# Patient Record
Sex: FEMALE | Race: WHITE | NOT HISPANIC OR LATINO | Employment: FULL TIME | URBAN - METROPOLITAN AREA
[De-identification: names, ages, dates, MRNs, and addresses within clinical notes are randomized per-mention and may not be internally consistent; named-entity substitution may affect disease eponyms.]

---

## 2023-01-23 ENCOUNTER — APPOINTMENT (OUTPATIENT)
Dept: CT IMAGING | Facility: HOSPITAL | Age: 58
End: 2023-01-23
Payer: COMMERCIAL

## 2023-01-23 ENCOUNTER — APPOINTMENT (OUTPATIENT)
Dept: GENERAL RADIOLOGY | Facility: HOSPITAL | Age: 58
End: 2023-01-23
Payer: COMMERCIAL

## 2023-01-23 ENCOUNTER — HOSPITAL ENCOUNTER (EMERGENCY)
Facility: HOSPITAL | Age: 58
Discharge: HOME | End: 2023-01-23
Attending: EMERGENCY MEDICINE | Admitting: EMERGENCY MEDICINE
Payer: COMMERCIAL

## 2023-01-23 VITALS
TEMPERATURE: 98.3 F | OXYGEN SATURATION: 100 % | RESPIRATION RATE: 18 BRPM | BODY MASS INDEX: 39.95 KG/M2 | SYSTOLIC BLOOD PRESSURE: 155 MMHG | HEIGHT: 64 IN | WEIGHT: 234 LBS | DIASTOLIC BLOOD PRESSURE: 87 MMHG | HEART RATE: 54 BPM

## 2023-01-23 DIAGNOSIS — R55 NEAR SYNCOPE: Primary | ICD-10-CM

## 2023-01-23 DIAGNOSIS — R53.83 FATIGUE, UNSPECIFIED TYPE: ICD-10-CM

## 2023-01-23 LAB
ALBUMIN SERPL-MCNC: 4.8 G/DL (ref 3.5–5.2)
ALBUMIN/GLOB SERPL: 1.7 G/DL
ALP SERPL-CCNC: 101 U/L (ref 39–117)
ALT SERPL W P-5'-P-CCNC: 17 U/L (ref 1–33)
ANION GAP SERPL CALCULATED.3IONS-SCNC: 13 MMOL/L (ref 5–15)
AST SERPL-CCNC: 24 U/L (ref 1–32)
BASOPHILS # BLD AUTO: 0.04 10*3/MM3 (ref 0–0.2)
BASOPHILS NFR BLD AUTO: 0.6 % (ref 0–1.5)
BILIRUB SERPL-MCNC: 0.4 MG/DL (ref 0–1.2)
BUN SERPL-MCNC: 18 MG/DL (ref 6–20)
BUN/CREAT SERPL: 23.1 (ref 7–25)
CALCIUM SPEC-SCNC: 9.9 MG/DL (ref 8.6–10.5)
CHLORIDE SERPL-SCNC: 104 MMOL/L (ref 98–107)
CO2 SERPL-SCNC: 23 MMOL/L (ref 22–29)
CREAT SERPL-MCNC: 0.78 MG/DL (ref 0.57–1)
D DIMER PPP FEU-MCNC: 0.31 MCGFEU/ML (ref 0–0.56)
DEPRECATED RDW RBC AUTO: 41.6 FL (ref 37–54)
EGFRCR SERPLBLD CKD-EPI 2021: 89.3 ML/MIN/1.73
EOSINOPHIL # BLD AUTO: 0.31 10*3/MM3 (ref 0–0.4)
EOSINOPHIL NFR BLD AUTO: 4.5 % (ref 0.3–6.2)
ERYTHROCYTE [DISTWIDTH] IN BLOOD BY AUTOMATED COUNT: 12.9 % (ref 12.3–15.4)
GLOBULIN UR ELPH-MCNC: 2.9 GM/DL
GLUCOSE SERPL-MCNC: 117 MG/DL (ref 65–99)
HCT VFR BLD AUTO: 44.3 % (ref 34–46.6)
HGB BLD-MCNC: 14.9 G/DL (ref 12–15.9)
HOLD SPECIMEN: NORMAL
IMM GRANULOCYTES # BLD AUTO: 0.01 10*3/MM3 (ref 0–0.05)
IMM GRANULOCYTES NFR BLD AUTO: 0.1 % (ref 0–0.5)
LYMPHOCYTES # BLD AUTO: 2.68 10*3/MM3 (ref 0.7–3.1)
LYMPHOCYTES NFR BLD AUTO: 38.7 % (ref 19.6–45.3)
MAGNESIUM SERPL-MCNC: 2.1 MG/DL (ref 1.6–2.6)
MCH RBC QN AUTO: 29.7 PG (ref 26.6–33)
MCHC RBC AUTO-ENTMCNC: 33.6 G/DL (ref 31.5–35.7)
MCV RBC AUTO: 88.4 FL (ref 79–97)
MONOCYTES # BLD AUTO: 0.46 10*3/MM3 (ref 0.1–0.9)
MONOCYTES NFR BLD AUTO: 6.6 % (ref 5–12)
NEUTROPHILS NFR BLD AUTO: 3.42 10*3/MM3 (ref 1.7–7)
NEUTROPHILS NFR BLD AUTO: 49.5 % (ref 42.7–76)
NRBC BLD AUTO-RTO: 0 /100 WBC (ref 0–0.2)
PLATELET # BLD AUTO: 303 10*3/MM3 (ref 140–450)
PMV BLD AUTO: 9.2 FL (ref 6–12)
POTASSIUM SERPL-SCNC: 4.1 MMOL/L (ref 3.5–5.2)
PROT SERPL-MCNC: 7.7 G/DL (ref 6–8.5)
RBC # BLD AUTO: 5.01 10*6/MM3 (ref 3.77–5.28)
SODIUM SERPL-SCNC: 140 MMOL/L (ref 136–145)
TROPONIN T SERPL-MCNC: <0.01 NG/ML (ref 0–0.03)
WBC NRBC COR # BLD: 6.92 10*3/MM3 (ref 3.4–10.8)
WHOLE BLOOD HOLD COAG: NORMAL
WHOLE BLOOD HOLD SPECIMEN: NORMAL

## 2023-01-23 PROCEDURE — 85379 FIBRIN DEGRADATION QUANT: CPT | Performed by: EMERGENCY MEDICINE

## 2023-01-23 PROCEDURE — 84484 ASSAY OF TROPONIN QUANT: CPT | Performed by: EMERGENCY MEDICINE

## 2023-01-23 PROCEDURE — 85025 COMPLETE CBC W/AUTO DIFF WBC: CPT

## 2023-01-23 PROCEDURE — 93005 ELECTROCARDIOGRAM TRACING: CPT | Performed by: EMERGENCY MEDICINE

## 2023-01-23 PROCEDURE — 36415 COLL VENOUS BLD VENIPUNCTURE: CPT

## 2023-01-23 PROCEDURE — 70450 CT HEAD/BRAIN W/O DYE: CPT

## 2023-01-23 PROCEDURE — 99284 EMERGENCY DEPT VISIT MOD MDM: CPT

## 2023-01-23 PROCEDURE — 71045 X-RAY EXAM CHEST 1 VIEW: CPT

## 2023-01-23 PROCEDURE — 99283 EMERGENCY DEPT VISIT LOW MDM: CPT

## 2023-01-23 PROCEDURE — 80053 COMPREHEN METABOLIC PANEL: CPT | Performed by: EMERGENCY MEDICINE

## 2023-01-23 PROCEDURE — 93005 ELECTROCARDIOGRAM TRACING: CPT

## 2023-01-23 PROCEDURE — 83735 ASSAY OF MAGNESIUM: CPT | Performed by: EMERGENCY MEDICINE

## 2023-01-23 RX ORDER — SODIUM CHLORIDE 0.9 % (FLUSH) 0.9 %
10 SYRINGE (ML) INJECTION AS NEEDED
Status: DISCONTINUED | OUTPATIENT
Start: 2023-01-23 | End: 2023-01-23 | Stop reason: HOSPADM

## 2023-01-23 NOTE — DISCHARGE INSTRUCTIONS
Make sure the rest and drink plenty of fluids.    Follow-up with cardiologist for further outpatient evaluation.    Return to the ER with any further concern.

## 2023-01-23 NOTE — Clinical Note
[facility] EMERGENCY DEPARTMENT  [address] [address]  Phone: [phone]    Radha Dow was seen and treated in our emergency department on 1/23/2023.  She may return to work on 01/26/2023.         Thank you for choosing [facility].    Erum Cox MD

## 2023-01-23 NOTE — ED PROVIDER NOTES
Subjective   History of Present Illness  57-year-old female who presents for evaluation of near syncopal episode.  The patient reports that she was at work at roughly 8:30 AM talking to a coworker when she began to feel dizzy and faint.  She was eased to the ground did not truly lose consciousness.  She did feel weak at that time.  The symptoms now have essentially completely resolved.  She denies having an episode like this in the past.  She does report that she is doing intermittent fasting, she stopped eating at 8 PM and does not usually eat for greater than 12 hours until the next day.  However she has continued to eat throughout the day and has continued to drink plenty of fluids even when she is not eating.  She denies recent fever, body aches, or chills.  She denies any sick contacts.  She denies any new medications.  She does report intermittently for the last several weeks she will have episodes where she feels like her vision is altered, and then it slowly returns to normal.  She denies palpitation.  No reported abdominal pain.  No reported change in bowel or urinary function.  No other acute complaints        Review of Systems   Constitutional: Negative for chills, fatigue and fever.   HENT: Negative for congestion, ear pain, postnasal drip, sinus pressure and sore throat.    Eyes: Positive for visual disturbance. Negative for pain and redness.   Respiratory: Negative for cough, chest tightness and shortness of breath.    Cardiovascular: Negative for chest pain, palpitations and leg swelling.   Gastrointestinal: Negative for abdominal pain, anal bleeding, blood in stool, diarrhea, nausea and vomiting.   Endocrine: Negative for polydipsia and polyuria.   Genitourinary: Negative for difficulty urinating, dysuria, frequency and urgency.   Musculoskeletal: Negative for arthralgias, back pain and neck pain.   Skin: Negative for pallor and rash.   Allergic/Immunologic: Negative for environmental allergies and  immunocompromised state.   Neurological: Positive for dizziness, weakness and light-headedness. Negative for headaches.   Hematological: Negative for adenopathy.   Psychiatric/Behavioral: Negative for confusion, self-injury and suicidal ideas. The patient is not nervous/anxious.    All other systems reviewed and are negative.      No past medical history on file.    No Known Allergies    No past surgical history on file.    No family history on file.    Social History     Socioeconomic History   • Marital status:            Objective   Physical Exam  Vitals and nursing note reviewed.   Constitutional:       General: She is not in acute distress.     Appearance: Normal appearance. She is well-developed. She is not toxic-appearing or diaphoretic.   HENT:      Head: Normocephalic and atraumatic.      Right Ear: External ear normal.      Left Ear: External ear normal.      Nose: Nose normal.   Eyes:      General: Lids are normal.      Pupils: Pupils are equal, round, and reactive to light.   Neck:      Trachea: No tracheal deviation.   Cardiovascular:      Rate and Rhythm: Normal rate and regular rhythm.      Pulses: No decreased pulses.      Heart sounds: Normal heart sounds. No murmur heard.    No friction rub. No gallop.   Pulmonary:      Effort: Pulmonary effort is normal. No respiratory distress.      Breath sounds: Normal breath sounds. No decreased breath sounds, wheezing, rhonchi or rales.   Abdominal:      General: Bowel sounds are normal.      Palpations: Abdomen is soft.      Tenderness: There is no abdominal tenderness. There is no guarding or rebound.   Musculoskeletal:         General: No deformity. Normal range of motion.      Cervical back: Normal range of motion and neck supple.   Lymphadenopathy:      Cervical: No cervical adenopathy.   Skin:     General: Skin is warm and dry.      Findings: No rash.   Neurological:      Mental Status: She is alert and oriented to person, place, and time.       Cranial Nerves: No cranial nerve deficit.      Sensory: No sensory deficit.   Psychiatric:         Speech: Speech normal.         Behavior: Behavior normal.         Thought Content: Thought content normal.         Judgment: Judgment normal.         Procedures           ED Course                                           Medical Decision Making  Differential diagnosis includes syncope, vasovagal syncope, dysrhythmia, intracranial hemorrhage, stroke, otherwise unspecified alteration in mental status.    CT scan of the head without contrast shows no acute abnormalities.  On exam the patient is well-appearing with normal vital signs.  No focal neurological deficits with NIH stroke scale of 0 and a GCS of 15.    She has current normal vital signs.  D-dimer is normal.    The patient will be discharged with referral to the syncope clinic.    Near syncope: acute illness or injury     Details: Normal CT scan of the head.  D-dimer normal.  EKG shows normal sinus rhythm with normal troponin and normal vital signs.  Amount and/or Complexity of Data Reviewed  Independent Historian: spouse  Labs: ordered.     Details: Normal troponin, normal D-dimer.  Radiology: ordered.     Details: CT scan of the head without contrast shows no acute abnormalities.  Normal chest x-ray.  ECG/medicine tests: ordered.     Details: EKG shows normal sinus rhythm.      Risk  Prescription drug management.          Final diagnoses:   Near syncope   Fatigue, unspecified type       ED Disposition  ED Disposition     ED Disposition   Discharge    Condition   Stable    Comment   --             [facility] CARDIOLOGY  [address]  [place] [address] [phone]             Medication List      No changes were made to your prescriptions during this visit.          Erum Cox MD  01/23/23 7058

## 2023-01-27 LAB
QT INTERVAL: 384 MS
QTC INTERVAL: 434 MS

## 2023-01-30 ENCOUNTER — OFFICE VISIT (OUTPATIENT)
Dept: CARDIOLOGY | Facility: HOSPITAL | Age: 58
End: 2023-01-30
Payer: COMMERCIAL

## 2023-01-30 VITALS
HEART RATE: 66 BPM | OXYGEN SATURATION: 97 % | HEIGHT: 64 IN | SYSTOLIC BLOOD PRESSURE: 109 MMHG | TEMPERATURE: 96.9 F | WEIGHT: 242 LBS | BODY MASS INDEX: 41.32 KG/M2 | DIASTOLIC BLOOD PRESSURE: 74 MMHG | RESPIRATION RATE: 17 BRPM

## 2023-01-30 DIAGNOSIS — R55 NEAR SYNCOPE: Primary | ICD-10-CM

## 2023-01-30 PROCEDURE — 99203 OFFICE O/P NEW LOW 30 MIN: CPT | Performed by: NURSE PRACTITIONER

## 2023-01-30 RX ORDER — CHLORAL HYDRATE 500 MG
1 CAPSULE ORAL DAILY
COMMUNITY

## 2023-01-30 RX ORDER — MULTIPLE VITAMINS W/ MINERALS TAB 9MG-400MCG
1 TAB ORAL DAILY
COMMUNITY

## 2023-01-30 RX ORDER — FAMOTIDINE 10 MG
10 TABLET ORAL DAILY
COMMUNITY

## 2023-01-30 NOTE — PROGRESS NOTES
"Chief Complaint  Establish Care and Loss of Consciousness    Subjective    History of Present Illness {CC  Problem List  Visit  Diagnosis   Encounters  Notes  Medications  Labs  Result Review Imaging  Media :23}       History of Present Illness   57-year-old female presents the office today for ongoing evaluation of her recent near syncopal spell.  Patient reports that she was talking to her coworker on the morning of 1/23/2023 when she began to feel dizzy and faint.  She was eased to the ground and did not lose consciousness.  She did note associated fatigue and weakness.  Patient recently started intermittent fasting at the beginning of this month.  Patient reports she has never had a syncopal spell before.  She reports she is feeling well currently denies chest pain, dyspnea, dizziness, presyncope or syncope.  Objective     Vital Signs:   Vitals:    01/30/23 1442 01/30/23 1443   BP: 110/79 109/74   BP Location: Left arm Left arm   Patient Position: Sitting Standing   Cuff Size: Adult Adult   Pulse: 66    Resp: 17    Temp: 96.9 °F (36.1 °C)    TempSrc: Temporal    SpO2: 97%    Weight: 110 kg (242 lb)    Height: 162.6 cm (64\")      Body mass index is 41.54 kg/m².  Physical Exam  Vitals and nursing note reviewed.   Constitutional:       Appearance: Normal appearance.   HENT:      Head: Normocephalic.   Eyes:      Pupils: Pupils are equal, round, and reactive to light.   Cardiovascular:      Rate and Rhythm: Normal rate and regular rhythm.      Pulses: Normal pulses.      Heart sounds: Normal heart sounds. No murmur heard.  Pulmonary:      Effort: Pulmonary effort is normal.      Breath sounds: Normal breath sounds.   Abdominal:      General: Bowel sounds are normal.      Palpations: Abdomen is soft.   Musculoskeletal:         General: Normal range of motion.      Cervical back: Normal range of motion.      Right lower leg: No edema.      Left lower leg: No edema.   Skin:     General: Skin is warm and dry. "      Capillary Refill: Capillary refill takes less than 2 seconds.   Neurological:      Mental Status: She is alert and oriented to person, place, and time.   Psychiatric:         Mood and Affect: Mood normal.         Thought Content: Thought content normal.              Result Review  Data Reviewed:{ Labs  Result Review  Imaging  Med Tab  Media :23}   Vent. Rate :  77 BPM     Atrial Rate :  77 BPM     P-R Int : 156 ms          QRS Dur :  76 ms      QT Int : 384 ms       P-R-T Axes :  18  48  20 degrees     QTc Int : 434 ms     Normal sinus rhythm  Normal ECG  No previous ECGs available  Confirmed by AMIE GRADY ([id number]) on 1/27/2023 4:40:29 PM     Referred By: EDMD           Confirmed By: AMIE GRADY    D-dimer, Quantitative (01/23/2023 09:46)  Magnesium (01/23/2023 09:46)  Troponin (01/23/2023 09:46)  Comprehensive Metabolic Panel (01/23/2023 09:46)  CBC & Differential (01/23/2023 09:46)           Assessment and Plan {CC Problem List  Visit Diagnosis  ROS  Review (Popup)  Health Maintenance  Quality  BestPractice  Medications  SmartSets  SnapShot Encounters  Media :23}   1. Near syncope  Suspect related to intermittent fasting  Encouraged good hydration  Did discuss testing today such as echo and Holter monitor however patient would like to defer at this time          Follow Up {Instructions Charge Capture  Follow-up Communications :23}   Return if symptoms worsen or fail to improve.    Patient was given instructions and counseling regarding her condition or for health maintenance advice. Please see specific information pulled into the AVS if appropriate.  Patient was instructed to call the [facility] with any questions, concerns, or worsening symptoms.

## 2024-10-04 ENCOUNTER — OFFICE VISIT (OUTPATIENT)
Dept: INTERNAL MEDICINE | Facility: CLINIC | Age: 59
End: 2024-10-04
Payer: COMMERCIAL

## 2024-10-04 ENCOUNTER — LAB (OUTPATIENT)
Dept: LAB | Facility: HOSPITAL | Age: 59
End: 2024-10-04
Payer: COMMERCIAL

## 2024-10-04 VITALS
TEMPERATURE: 98.2 F | DIASTOLIC BLOOD PRESSURE: 84 MMHG | HEIGHT: 64 IN | HEART RATE: 65 BPM | OXYGEN SATURATION: 99 % | WEIGHT: 226.2 LBS | BODY MASS INDEX: 38.62 KG/M2 | SYSTOLIC BLOOD PRESSURE: 130 MMHG

## 2024-10-04 DIAGNOSIS — E55.9 VITAMIN D DEFICIENCY: ICD-10-CM

## 2024-10-04 DIAGNOSIS — Z12.11 COLON CANCER SCREENING: ICD-10-CM

## 2024-10-04 DIAGNOSIS — Z00.00 HEALTHCARE MAINTENANCE: ICD-10-CM

## 2024-10-04 DIAGNOSIS — Z12.31 BREAST CANCER SCREENING BY MAMMOGRAM: ICD-10-CM

## 2024-10-04 DIAGNOSIS — M25.541 ARTHRALGIA OF BOTH HANDS: ICD-10-CM

## 2024-10-04 DIAGNOSIS — Z00.00 HEALTHCARE MAINTENANCE: Primary | ICD-10-CM

## 2024-10-04 DIAGNOSIS — M25.542 ARTHRALGIA OF BOTH HANDS: ICD-10-CM

## 2024-10-04 DIAGNOSIS — M79.89 LEG SWELLING: ICD-10-CM

## 2024-10-04 LAB — HBA1C MFR BLD: 5 % (ref 4.8–5.6)

## 2024-10-04 PROCEDURE — 86803 HEPATITIS C AB TEST: CPT

## 2024-10-04 PROCEDURE — 83036 HEMOGLOBIN GLYCOSYLATED A1C: CPT

## 2024-10-04 PROCEDURE — 84443 ASSAY THYROID STIM HORMONE: CPT

## 2024-10-04 PROCEDURE — 82607 VITAMIN B-12: CPT

## 2024-10-04 PROCEDURE — 86431 RHEUMATOID FACTOR QUANT: CPT

## 2024-10-04 PROCEDURE — 80053 COMPREHEN METABOLIC PANEL: CPT

## 2024-10-04 PROCEDURE — 82306 VITAMIN D 25 HYDROXY: CPT

## 2024-10-04 PROCEDURE — 86200 CCP ANTIBODY: CPT

## 2024-10-04 PROCEDURE — 80061 LIPID PANEL: CPT

## 2024-10-04 PROCEDURE — 84439 ASSAY OF FREE THYROXINE: CPT

## 2024-10-04 PROCEDURE — 36415 COLL VENOUS BLD VENIPUNCTURE: CPT

## 2024-10-04 NOTE — PROGRESS NOTES
New Patient Office Visit      Date: 10/04/2024   Patient Name: Radha Dow  : 1965   MRN: [id number]     Chief Complaint:    Chief Complaint   Patient presents with    Establish Care    Hand Pain     Arthritis    Chronic Fatigue       History of Present Illness: Radha Dow is a 59 y.o. female who is here today to establish care.      She has been having a few new symptoms since having COVID a few years ago.  She reports diffuse joint pains most notable in her hands, feet and bilateral knees.  She reports significant morning stiffness, and pain at night.  She has tried Voltaren gel which has not helped her pain she reports heat does sometimes help.  She has also had some hair thinning, chronic fatigue, oral ulcers, muscle weakness.  She denies any history of blood clots.  Denies history of miscarriage.  Her father does have a history of rheumatoid arthritis.  She has also had progressive bilateral ankle swelling.  She reports history of syncope in the past and had an echocardiogram which she reports showed some valve regurgitation.    She does have a history of MTHR mutation     Blood pressure - 130/84  BMI -38.83    Chronic medical conditions:  No current medical conditions, she takes no daily medications    Social history: Patient lives in [place] with .  Smoking: denies, Alcohol history: socially, Drug history: denies.      Subjective      Review of Systems:   Review of Systems   Constitutional:  Negative for chills and fever.   Respiratory:  Negative for cough and shortness of breath.    Gastrointestinal:  Positive for diarrhea. Negative for blood in stool.   Endocrine: Positive for cold intolerance.   Musculoskeletal:  Positive for arthralgias.   Neurological:  Negative for light-headedness and headache.       Past Medical History:   Past Medical History:   Diagnosis Date    Anxiety     Yes, off and on over the years    Arthritis     Cholelithiasis     Had gb removed    Colon  "polyp     Depression     GERD (gastroesophageal reflux disease)     Kidney stone     MTHFR gene mutation     Obesity     Urinary tract infection        Past Surgical History:   Past Surgical History:   Procedure Laterality Date     SECTION      CHOLECYSTECTOMY      COLONOSCOPY  2018    HYSTERECTOMY      partial    TONSILLECTOMY      TUBAL ABDOMINAL LIGATION         Family History:   Family History   Problem Relation Age of Onset    Heart attack Paternal Grandfather     Heart failure Paternal Grandfather     Hypertension Mother     Alcohol abuse Mother     Anxiety disorder Mother     Alcohol abuse Father     Anxiety disorder Father     Depression Father     Alcohol abuse Maternal Grandfather     Anxiety disorder Maternal Grandmother     Anxiety disorder Maternal Aunt        Social History:   Social History     Socioeconomic History    Marital status:    Tobacco Use    Smoking status: Never   Substance and Sexual Activity    Alcohol use: Yes     Comment: Wvery once in awhile    Drug use: Not Currently     Types: Marijuana    Sexual activity: Yes     Partners: Male     Birth control/protection: Post-menopausal, Tubal ligation, Hysterectomy       Medications:     Current Outpatient Medications:     Omega-3 1000 MG capsule, Take 1 capsule by mouth Daily., Disp: , Rfl:     Vitamin D-Vitamin K (VITAMIN K2-VITAMIN D3 PO), Take 1 tablet by mouth Daily., Disp: , Rfl:     Allergies:   No Known Allergies    Objective     Physical Exam:  Vital Signs:   Vitals:    10/04/24 1451   BP: 130/84   BP Location: Left arm   Patient Position: Sitting   Cuff Size: Adult   Pulse: 65   Temp: 98.2 °F (36.8 °C)   TempSrc: Infrared   SpO2: 99%   Weight: 103 kg (226 lb 3.2 oz)   Height: 162.6 cm (64\")   PainSc: 0-No pain     Body mass index is 38.83 kg/m².         Physical Exam  Constitutional:       Appearance: Normal appearance.   HENT:      Head: Normocephalic and atraumatic.   Eyes:      Extraocular Movements: " Extraocular movements intact.      Conjunctiva/sclera: Conjunctivae normal.   Pulmonary:      Effort: Pulmonary effort is normal. No respiratory distress.   Skin:     General: Skin is warm and dry.   Neurological:      General: No focal deficit present.      Mental Status: She is alert and oriented to person, place, and time.   Psychiatric:         Mood and Affect: Mood normal.         Behavior: Behavior normal.         Assessment / Plan      Assessment/Plan:   Diagnoses and all orders for this visit:    1. Healthcare maintenance (Primary)  Healthcare Maintenance:   Immunizations:  Td/Tdap(Booster Q 10 yrs): Ordered today  Flu (Yearly): Ordered today  PCV20:  at 67 yo  Shingles: First dose today    Screening:  Colorectal Screening:  ordered today  Pap:  s/p total hysterectomy  Mammogram:  ordered today  Hep C ( [id number]): Ordered today    Annual Labs: lipid, A1c, CMP, Vitamin D ordered today  -     Lipid panel; Future  -     Comprehensive metabolic panel; Future  -     Hemoglobin A1c; Future  -     Vitamin B12; Future  -     Hepatitis C antibody; Future  -     Fluzone >6mos  -     Shingrix Vaccine  -     Td Vaccine => 8yo PF (TDVAX) 2-2    2. Arthralgia of both hands  -Will order bilateral hand x-ray as well as rheumatoid factor and anti-CCP to rule out rheumatoid arthritis.  I will also check thyroid function given her associated symptoms of hair thinning, cold intolerance, fatigue  -     TSH; Future  -     T4, free; Future  -     Rheumatoid Factor, Quant; Future  -     Cyclic Citrul Peptide Antibody, IgG / IgA; Future  -     XR Hand 2 View Bilateral; Future    3. Leg swelling  -Patient has reported some mild leg swelling as well as a history of echocardiogram notable for valve regurgitation.  I will obtain repeat echo for reevaluation.  -     Adult Transthoracic Echo Complete W/ Cont if Necessary Per Protocol; Future    4. Breast cancer screening by mammogram  -     Mammo Screening Digital Tomosynthesis  Bilateral With CAD; Future    5. Colon cancer screening  -     Ambulatory Referral For Screening Colonoscopy    6. Vitamin D deficiency  -     Vitamin D 25 hydroxy; Future      Follow Up:   Return in about 3 months (around 1/4/2025) for Annual physical.    Ronna Pina MD   [facility] [address]

## 2024-10-04 NOTE — LETTER
[facility]  Vaccine Consent Form    Patient Name:  Radha Dow  Patient :  1965     Vaccine(s) Ordered    Fluzone >6mos  Shingrix Vaccine  Td Vaccine => 8yo PF (TDVAX) 2-2        Screening Checklist  The following questions should be completed prior to vaccination. If you answer “yes” to any question, it does not necessarily mean you should not be vaccinated. It just means we may need to clarify or ask more questions. If a question is unclear, please ask your healthcare provider to explain it.    Yes No   Any fever or moderate to severe illness today (mild illness and/or antibiotic treatment are not contraindications)?     Do you have a history of a serious reaction to any previous vaccinations, such as anaphylaxis, encephalopathy within 7 days, Guillain-[place] syndrome within 6 weeks, seizure?     Have you received any live vaccine(s) (e.g MMR, SYBIL) or any other vaccines in the last month (to ensure duplicate doses aren't given)?     Do you have an anaphylactic allergy to latex (DTaP, DTaP-IPV, Hep A, Hep B, MenB, RV, Td, Tdap), baker’s yeast (Hep B, HPV), polysorbates (RSV, nirsevimab, PCV 20, Rotavirrus, Tdap, Shingrix), or gelatin (SYBIL, MMR)?     Do you have an anaphylactic allergy to neomycin (Rabies, SYBIL, MMR, IPV, Hep A), polymyxin B (IPV), or streptomycin (IPV)?      Any cancer, leukemia, AIDS, or other immune system disorder? (SYBIL, MMR, RV)     Do you have a parent, brother, or sister with an immune system problem (if immune competence of vaccine recipient clinically verified, can proceed)? (MMR, SYBIL)     Any recent steroid treatments for >2 weeks, chemotherapy, or radiation treatment? (SYBIL, MMR)     Have you received antibody-containing blood transfusions or IVIG in the past 11 months (recommended interval is dependent on product)? (MMR, SYBIL)     Have you taken antiviral drugs (acyclovir, famciclovir, valacyclovir for SYBIL) in the last 24 or 48 hours, respectively?      Are you pregnant or  "planning to become pregnant within 1 month? (SYBIL, MMR, HPV, IPV, MenB, Abrexvy; For Hep B- refer to Engerix-B; For RSV - Abrysvo is indicated for 32-36 weeks of pregnancy from September to January)     For infants, have you ever been told your child has had intussusception or a medical emergency involving obstruction of the intestine (Rotavirus)? If not for an infant, can skip this question.         *Ordering Physicians/APC should be consulted if \"yes\" is checked by the patient or guardian above.  I have received, read, and understand the Vaccine Information Statement (VIS) for each vaccine ordered.  I have considered my or my child's health status as well as the health status of my close contacts.  I have taken the opportunity to discuss my vaccine questions with my or my child's health care provider.   I have requested that the ordered vaccine(s) be given to me or my child.  I understand the benefits and risks of the vaccines.  I understand that I should remain in the clinic for 15 minutes after receiving the vaccine(s).  _________________________________________________________  Signature of Patient or Parent/Legal Guardian ____________________  Date     "

## 2024-10-05 LAB
25(OH)D3 SERPL-MCNC: 30.5 NG/ML (ref 30–100)
ALBUMIN SERPL-MCNC: 4.5 G/DL (ref 3.5–5.2)
ALBUMIN/GLOB SERPL: 1.9 G/DL
ALP SERPL-CCNC: 125 U/L (ref 39–117)
ALT SERPL W P-5'-P-CCNC: 15 U/L (ref 1–33)
ANION GAP SERPL CALCULATED.3IONS-SCNC: 11 MMOL/L (ref 5–15)
AST SERPL-CCNC: 17 U/L (ref 1–32)
BILIRUB SERPL-MCNC: 0.4 MG/DL (ref 0–1.2)
BUN SERPL-MCNC: 14 MG/DL (ref 6–20)
BUN/CREAT SERPL: 15.7 (ref 7–25)
CALCIUM SPEC-SCNC: 9.6 MG/DL (ref 8.6–10.5)
CHLORIDE SERPL-SCNC: 104 MMOL/L (ref 98–107)
CHOLEST SERPL-MCNC: 205 MG/DL (ref 0–200)
CHROMATIN AB SERPL-ACNC: <10 IU/ML (ref 0–14)
CO2 SERPL-SCNC: 24 MMOL/L (ref 22–29)
CREAT SERPL-MCNC: 0.89 MG/DL (ref 0.57–1)
EGFRCR SERPLBLD CKD-EPI 2021: 75.3 ML/MIN/1.73
GLOBULIN UR ELPH-MCNC: 2.4 GM/DL
GLUCOSE SERPL-MCNC: 86 MG/DL (ref 65–99)
HCV AB SER QL: NORMAL
HDLC SERPL-MCNC: 60 MG/DL (ref 40–60)
LDLC SERPL CALC-MCNC: 120 MG/DL (ref 0–100)
LDLC/HDLC SERPL: 1.94 {RATIO}
POTASSIUM SERPL-SCNC: 3.8 MMOL/L (ref 3.5–5.2)
PROT SERPL-MCNC: 6.9 G/DL (ref 6–8.5)
SODIUM SERPL-SCNC: 139 MMOL/L (ref 136–145)
T4 FREE SERPL-MCNC: 1.44 NG/DL (ref 0.92–1.68)
TRIGL SERPL-MCNC: 142 MG/DL (ref 0–150)
TSH SERPL DL<=0.05 MIU/L-ACNC: 1.36 UIU/ML (ref 0.27–4.2)
VIT B12 BLD-MCNC: 376 PG/ML (ref 211–946)
VLDLC SERPL-MCNC: 25 MG/DL (ref 5–40)

## 2024-10-07 LAB — CCP IGA+IGG SERPL IA-ACNC: 6 UNITS (ref 0–19)

## 2024-10-09 ENCOUNTER — PATIENT ROUNDING (BHMG ONLY) (OUTPATIENT)
Dept: INTERNAL MEDICINE | Facility: CLINIC | Age: 59
End: 2024-10-09
Payer: COMMERCIAL

## 2024-11-08 ENCOUNTER — HOSPITAL ENCOUNTER (OUTPATIENT)
Dept: MAMMOGRAPHY | Facility: HOSPITAL | Age: 59
Discharge: HOME | End: 2024-11-08
Admitting: STUDENT IN AN ORGANIZED HEALTH CARE EDUCATION/TRAINING PROGRAM
Payer: COMMERCIAL

## 2024-11-08 DIAGNOSIS — Z12.31 BREAST CANCER SCREENING BY MAMMOGRAM: ICD-10-CM

## 2024-11-08 PROCEDURE — 77063 BREAST TOMOSYNTHESIS BI: CPT

## 2024-11-08 PROCEDURE — 77067 SCR MAMMO BI INCL CAD: CPT

## 2024-11-20 ENCOUNTER — HOSPITAL ENCOUNTER (OUTPATIENT)
Dept: ULTRASOUND IMAGING | Facility: HOSPITAL | Age: 59
Discharge: HOME | End: 2024-11-20
Payer: COMMERCIAL

## 2024-11-20 ENCOUNTER — HOSPITAL ENCOUNTER (OUTPATIENT)
Dept: MAMMOGRAPHY | Facility: HOSPITAL | Age: 59
Discharge: HOME | End: 2024-11-20
Payer: COMMERCIAL

## 2024-11-20 DIAGNOSIS — R92.8 ABNORMAL MAMMOGRAM: ICD-10-CM

## 2024-11-20 PROCEDURE — 76642 ULTRASOUND BREAST LIMITED: CPT

## 2024-11-20 PROCEDURE — 77062 BREAST TOMOSYNTHESIS BI: CPT | Performed by: RADIOLOGY

## 2024-11-20 PROCEDURE — 76642 ULTRASOUND BREAST LIMITED: CPT | Performed by: RADIOLOGY

## 2024-11-20 PROCEDURE — G0279 TOMOSYNTHESIS, MAMMO: HCPCS

## 2024-11-20 PROCEDURE — 77066 DX MAMMO INCL CAD BI: CPT

## 2024-11-20 PROCEDURE — 77066 DX MAMMO INCL CAD BI: CPT | Performed by: RADIOLOGY

## 2024-12-10 RX ORDER — SODIUM PICOSULFATE, MAGNESIUM OXIDE, AND ANHYDROUS CITRIC ACID 12; 3.5; 1 G/175ML; G/175ML; MG/175ML
350 LIQUID ORAL TAKE AS DIRECTED
Qty: 350 ML | Refills: 0 | Status: SHIPPED | OUTPATIENT
Start: 2024-12-10

## 2024-12-16 ENCOUNTER — TELEPHONE (OUTPATIENT)
Dept: GASTROENTEROLOGY | Facility: CLINIC | Age: 59
End: 2024-12-16
Payer: COMMERCIAL

## 2024-12-16 NOTE — TELEPHONE ENCOUNTER
PATIENT ADVISED PREP SENT TO THAT PHARMACY ON 12/10/2024.    PATIENT VOICED UNDERSTANDING, AND WILL CALL THEM.

## 2024-12-16 NOTE — TELEPHONE ENCOUNTER
Caller: Radha Dow    Relationship to patient: Self    Best call back number: [phone] (home)       Patient is needing: RX FOR PREP SENT TO [facility] [place] [facility]

## 2024-12-20 ENCOUNTER — OUTSIDE FACILITY SERVICE (OUTPATIENT)
Dept: GASTROENTEROLOGY | Facility: CLINIC | Age: 59
End: 2024-12-20
Payer: COMMERCIAL

## 2024-12-20 PROCEDURE — 88305 TISSUE EXAM BY PATHOLOGIST: CPT | Performed by: INTERNAL MEDICINE

## 2024-12-20 PROCEDURE — 45380 COLONOSCOPY AND BIOPSY: CPT | Performed by: INTERNAL MEDICINE

## 2024-12-20 PROCEDURE — 45385 COLONOSCOPY W/LESION REMOVAL: CPT | Performed by: INTERNAL MEDICINE

## 2024-12-23 ENCOUNTER — LAB REQUISITION (OUTPATIENT)
Dept: LAB | Facility: HOSPITAL | Age: 59
End: 2024-12-23
Payer: COMMERCIAL

## 2024-12-23 DIAGNOSIS — K64.8 OTHER HEMORRHOIDS: ICD-10-CM

## 2024-12-23 DIAGNOSIS — Z86.0100 PERSONAL HISTORY OF COLON POLYPS, UNSPECIFIED: ICD-10-CM

## 2024-12-23 DIAGNOSIS — Z12.11 ENCOUNTER FOR SCREENING FOR MALIGNANT NEOPLASM OF COLON: ICD-10-CM

## 2024-12-23 DIAGNOSIS — K57.30 DIVERTICULOSIS OF LARGE INTESTINE WITHOUT PERFORATION OR ABSCESS WITHOUT BLEEDING: ICD-10-CM

## 2024-12-23 DIAGNOSIS — D12.5 BENIGN NEOPLASM OF SIGMOID COLON: ICD-10-CM

## 2024-12-23 DIAGNOSIS — D12.8 BENIGN NEOPLASM OF RECTUM: ICD-10-CM

## 2024-12-24 LAB — REF LAB TEST METHOD: NORMAL

## 2025-01-03 ENCOUNTER — OFFICE VISIT (OUTPATIENT)
Dept: INTERNAL MEDICINE | Facility: CLINIC | Age: 60
End: 2025-01-03
Payer: COMMERCIAL

## 2025-01-03 VITALS
WEIGHT: 235 LBS | DIASTOLIC BLOOD PRESSURE: 74 MMHG | HEART RATE: 73 BPM | TEMPERATURE: 97.8 F | BODY MASS INDEX: 40.12 KG/M2 | OXYGEN SATURATION: 99 % | SYSTOLIC BLOOD PRESSURE: 112 MMHG | HEIGHT: 64 IN

## 2025-01-03 DIAGNOSIS — Z15.89 MTHFR GENE MUTATION: ICD-10-CM

## 2025-01-03 DIAGNOSIS — Z00.00 HEALTHCARE MAINTENANCE: ICD-10-CM

## 2025-01-03 DIAGNOSIS — E55.9 VITAMIN D DEFICIENCY: Primary | ICD-10-CM

## 2025-01-03 PROCEDURE — 90471 IMMUNIZATION ADMIN: CPT | Performed by: STUDENT IN AN ORGANIZED HEALTH CARE EDUCATION/TRAINING PROGRAM

## 2025-01-03 PROCEDURE — 90750 HZV VACC RECOMBINANT IM: CPT | Performed by: STUDENT IN AN ORGANIZED HEALTH CARE EDUCATION/TRAINING PROGRAM

## 2025-01-03 PROCEDURE — 99213 OFFICE O/P EST LOW 20 MIN: CPT | Performed by: STUDENT IN AN ORGANIZED HEALTH CARE EDUCATION/TRAINING PROGRAM

## 2025-01-03 RX ORDER — ALCOHOL 2.38 KG/3.79L
1 GEL TOPICAL 2 TIMES DAILY
Qty: 60 CAPSULE | Refills: 2 | Status: SHIPPED | OUTPATIENT
Start: 2025-01-03 | End: 2025-04-03

## 2025-01-03 RX ORDER — MAGNESIUM GLYCINATE 100 MG
CAPSULE ORAL NIGHTLY
COMMUNITY

## 2025-01-03 NOTE — LETTER
[facility]  Vaccine Consent Form    Patient Name:  Radha Dow  Patient :  1965  MRN: [id number]   Vaccine(s) Ordered    Shingrix Vaccine        Screening Checklist  The following questions should be completed prior to vaccination. If you answer “yes” to any question, it does not necessarily mean you should not be vaccinated. It just means we may need to clarify or ask more questions. If a question is unclear, please ask your healthcare provider to explain it.    Yes No   Any fever or moderate to severe illness today (mild illness and/or antibiotic treatment are not contraindications)?     Do you have a history of a serious reaction to any previous vaccinations, such as anaphylaxis, encephalopathy within 7 days, Guillain-[place] syndrome within 6 weeks, seizure?     Have you received any live vaccine(s) (e.g MMR, SYBIL) or any other vaccines in the last month (to ensure duplicate doses aren't given)?     Do you have an anaphylactic allergy to latex (DTaP, DTaP-IPV, Hep A, Hep B, MenB, RV, Td, Tdap), baker’s yeast (Hep B, HPV), polysorbates (RSV, nirsevimab, PCV 20, Rotavirrus, Tdap, Shingrix), or gelatin (SYBIL, MMR)?     Do you have an anaphylactic allergy to neomycin (Rabies, SYBIL, MMR, IPV, Hep A), polymyxin B (IPV), or streptomycin (IPV)?      Any cancer, leukemia, AIDS, or other immune system disorder? (SYBIL, MMR, RV)     Do you have a parent, brother, or sister with an immune system problem (if immune competence of vaccine recipient clinically verified, can proceed)? (MMR, SYBIL)     Any recent steroid treatments for >2 weeks, chemotherapy, or radiation treatment? (SYBIL, MMR)     Have you received antibody-containing blood transfusions or IVIG in the past 11 months (recommended interval is dependent on product)? (MMR, SYBIL)     Have you taken antiviral drugs (acyclovir, famciclovir, valacyclovir for SYBIL) in the last 24 or 48 hours, respectively?      Are you pregnant or planning to become pregnant  "within 1 month? (SYBIL, MMR, HPV, IPV, MenB, Abrexvy; For Hep B- refer to Engerix-B; For RSV - Abrysvo is indicated for 32-36 weeks of pregnancy from September to January)     For infants, have you ever been told your child has had intussusception or a medical emergency involving obstruction of the intestine (Rotavirus)? If not for an infant, can skip this question.         *Ordering Physicians/APC should be consulted if \"yes\" is checked by the patient or guardian above.  I have received, read, and understand the Vaccine Information Statement (VIS) for each vaccine ordered.  I have considered my or my child's health status as well as the health status of my close contacts.  I have taken the opportunity to discuss my vaccine questions with my or my child's health care provider.   I have requested that the ordered vaccine(s) be given to me or my child.  I understand the benefits and risks of the vaccines.  I understand that I should remain in the clinic for 15 minutes after receiving the vaccine(s).  _________________________________________________________  Signature of Patient or Parent/Legal Guardian ____________________  Date   "

## 2025-01-03 NOTE — PROGRESS NOTES
Office Note     Name: Radha Dow    : 1965     MRN: [id number]     Chief Complaint  Annual Exam    Subjective     History of Present Illness:  Radha Dow is a 59 y.o. female who presents today for 3-month follow-up.  Overall she has been feeling fairly well since her last visit with me.  She is still experiencing some burning sensation in the joints of her hands.    Since her last visit she has gotten her mammogram and colonoscopy done.  Colonoscopy did show some diverticulosis, a few polyps were removed.  Plan for repeat 5 years.    She is otherwise feeling well and has no acute complaints.    History of Present Illness      Review of Systems:   Review of Systems   Constitutional:  Negative for chills and fever.   Respiratory:  Negative for cough and shortness of breath.    Neurological:  Negative for light-headedness and headache.   All other systems reviewed and are negative.      Past Medical History:   Past Medical History:   Diagnosis Date    Anxiety     Yes, off and on over the years    Arthritis     Cholelithiasis     Had gb removed    Colon polyp     Depression     GERD (gastroesophageal reflux disease)     Kidney stone     MTHFR gene mutation     Obesity     Urinary tract infection        Past Surgical History:   Past Surgical History:   Procedure Laterality Date     SECTION      CHOLECYSTECTOMY      COLONOSCOPY  2018    HYSTERECTOMY      partial    TONSILLECTOMY      TUBAL ABDOMINAL LIGATION         Family History:   Family History   Problem Relation Age of Onset    Hypertension Mother     Alcohol abuse Mother     Anxiety disorder Mother     Alcohol abuse Father     Anxiety disorder Father     Depression Father     Anxiety disorder Maternal Grandmother     Anxiety disorder Maternal Aunt     Breast cancer Maternal Aunt         great post menopausal    Alcohol abuse Maternal Grandfather     Heart attack Paternal Grandfather     Heart failure Paternal  "Grandfather     Ovarian cancer Neg Hx        Social History:   Social History     Socioeconomic History    Marital status:    Tobacco Use    Smoking status: Never    Smokeless tobacco: Never   Vaping Use    Vaping status: Never Used   Substance and Sexual Activity    Alcohol use: Yes     Comment: Wvery once in awhile    Drug use: Not Currently     Types: Marijuana    Sexual activity: Yes     Partners: Male     Birth control/protection: Post-menopausal, Tubal ligation, Hysterectomy       Immunizations:   Immunization History   Administered Date(s) Administered    COVID-19 (PFIZER) Purple Cap Monovalent 03/26/2021, 04/16/2021    Fluzone  >6mos 10/04/2024    Fluzone (or Fluarix & Flulaval for VFC) >6mos 10/30/2018    Shingrix 10/04/2024, 01/03/2025    Td (TDVAX) 10/04/2024        Medications:     Current Outpatient Medications:     Magnesium Glycinate 100 MG capsule, Take  by mouth Every Night., Disp: , Rfl:     Omega-3 1000 MG capsule, Take 1 capsule by mouth Daily., Disp: , Rfl:     Vitamin D-Vitamin K (VITAMIN K2-VITAMIN D3 PO), Take 1 tablet by mouth Daily. (Patient taking differently: Take 1 tablet by mouth Daily. 40,000 units daily), Disp: , Rfl:     l-methylfolate-algae-B12-B6 3-90.314-2-35 MG capsule capsule, Take 1 capsule by mouth 2 (Two) Times a Day for 90 days., Disp: 60 capsule, Rfl: 2    Sod Picosulfate-Mag Ox-Cit Acd (Clenpiq) 10-3.5-12 MG-GM -GM/175ML solution, Take 350 mL by mouth Take As Directed for 1 dose. (Patient not taking: Reported on 1/3/2025), Disp: 350 mL, Rfl: 0    Allergies:   No Known Allergies    Objective     Vital Signs  /74 (BP Location: Left arm, Patient Position: Sitting, Cuff Size: Adult)   Pulse 73   Temp 97.8 °F (36.6 °C) (Temporal)   Ht 162.5 cm (63.98\")   Wt 107 kg (235 lb)   SpO2 99%   BMI 40.37 kg/m²   Body mass index is 40.37 kg/m².            Physical Exam  Constitutional:       Appearance: Normal appearance.   HENT:      Head: Normocephalic and " atraumatic.   Eyes:      Extraocular Movements: Extraocular movements intact.      Conjunctiva/sclera: Conjunctivae normal.   Pulmonary:      Effort: Pulmonary effort is normal. No respiratory distress.   Neurological:      General: No focal deficit present.      Mental Status: She is alert and oriented to person, place, and time.   Psychiatric:         Mood and Affect: Mood normal.         Behavior: Behavior normal.            Assessment and Plan     Assessment/Plan:  Diagnoses and all orders for this visit:    1. Vitamin D deficiency (Primary)  -At last visit vitamin D level was borderline low.  She has been taking vitamin D supplementation daily.  We will repeat this value today.  -     Vitamin D,25-Hydroxy; Future    2. Healthcare maintenance  Healthcare Maintenance:   Immunizations:  Td/Tdap(Booster Q 10 yrs): UTD  Flu (Yearly): UTD  PCV20:  at 65 yo  Shingles: Second dose today      Screening:  Colorectal Screenin, repeat 5 years  Pap:  s/p partial hysterectomy.  Will establish with gynecology for Pap smears.  Mammogram:  UTD  -     Shingrix Vaccine    3. MTHFR gene mutation  -     l-methylfolate-algae-B12-B6 3-90.314-2-35 MG capsule capsule; Take 1 capsule by mouth 2 (Two) Times a Day for 90 days.  Dispense: 60 capsule; Refill: 2      Follow Up  No follow-ups on file.      Ronna Pina MD   [facility] [place] [facility] [address]

## 2025-03-13 ENCOUNTER — TELEPHONE (OUTPATIENT)
Dept: INTERNAL MEDICINE | Facility: CLINIC | Age: 60
End: 2025-03-13
Payer: COMMERCIAL

## 2025-03-13 ENCOUNTER — LAB (OUTPATIENT)
Dept: LAB | Facility: HOSPITAL | Age: 60
End: 2025-03-13
Payer: COMMERCIAL

## 2025-03-13 ENCOUNTER — OFFICE VISIT (OUTPATIENT)
Dept: INTERNAL MEDICINE | Facility: CLINIC | Age: 60
End: 2025-03-13
Payer: COMMERCIAL

## 2025-03-13 VITALS
BODY MASS INDEX: 39.4 KG/M2 | HEIGHT: 64 IN | DIASTOLIC BLOOD PRESSURE: 82 MMHG | TEMPERATURE: 98.4 F | SYSTOLIC BLOOD PRESSURE: 146 MMHG | OXYGEN SATURATION: 99 % | HEART RATE: 79 BPM | WEIGHT: 230.8 LBS

## 2025-03-13 DIAGNOSIS — K92.1 MELENA: Primary | ICD-10-CM

## 2025-03-13 DIAGNOSIS — R10.84 GENERALIZED ABDOMINAL PAIN: ICD-10-CM

## 2025-03-13 DIAGNOSIS — K92.1 MELENA: ICD-10-CM

## 2025-03-13 LAB
BASOPHILS # BLD AUTO: 0.04 10*3/MM3 (ref 0–0.2)
BASOPHILS NFR BLD AUTO: 0.5 % (ref 0–1.5)
DEPRECATED RDW RBC AUTO: 41.3 FL (ref 37–54)
EOSINOPHIL # BLD AUTO: 0.3 10*3/MM3 (ref 0–0.4)
EOSINOPHIL NFR BLD AUTO: 3.4 % (ref 0.3–6.2)
ERYTHROCYTE [DISTWIDTH] IN BLOOD BY AUTOMATED COUNT: 13.1 % (ref 12.3–15.4)
HCT VFR BLD AUTO: 41 % (ref 34–46.6)
HGB BLD-MCNC: 13.9 G/DL (ref 12–15.9)
IMM GRANULOCYTES # BLD AUTO: 0.01 10*3/MM3 (ref 0–0.05)
IMM GRANULOCYTES NFR BLD AUTO: 0.1 % (ref 0–0.5)
LYMPHOCYTES # BLD AUTO: 3.3 10*3/MM3 (ref 0.7–3.1)
LYMPHOCYTES NFR BLD AUTO: 37.2 % (ref 19.6–45.3)
MCH RBC QN AUTO: 29.6 PG (ref 26.6–33)
MCHC RBC AUTO-ENTMCNC: 33.9 G/DL (ref 31.5–35.7)
MCV RBC AUTO: 87.2 FL (ref 79–97)
MONOCYTES # BLD AUTO: 0.62 10*3/MM3 (ref 0.1–0.9)
MONOCYTES NFR BLD AUTO: 7 % (ref 5–12)
NEUTROPHILS NFR BLD AUTO: 4.61 10*3/MM3 (ref 1.7–7)
NEUTROPHILS NFR BLD AUTO: 51.8 % (ref 42.7–76)
NRBC BLD AUTO-RTO: 0 /100 WBC (ref 0–0.2)
PLATELET # BLD AUTO: 318 10*3/MM3 (ref 140–450)
PMV BLD AUTO: 9.4 FL (ref 6–12)
RBC # BLD AUTO: 4.7 10*6/MM3 (ref 3.77–5.28)
WBC NRBC COR # BLD AUTO: 8.88 10*3/MM3 (ref 3.4–10.8)

## 2025-03-13 PROCEDURE — 99214 OFFICE O/P EST MOD 30 MIN: CPT | Performed by: INTERNAL MEDICINE

## 2025-03-13 PROCEDURE — 85025 COMPLETE CBC W/AUTO DIFF WBC: CPT

## 2025-03-13 RX ORDER — OMEPRAZOLE 40 MG/1
40 CAPSULE, DELAYED RELEASE ORAL DAILY
Qty: 30 CAPSULE | Refills: 0 | Status: SHIPPED | OUTPATIENT
Start: 2025-03-13

## 2025-03-13 NOTE — PROGRESS NOTES
[facility]     Radha Dow  1965   [id number]      Patient Care Team:  Ronna Pina MD as PCP - General (Internal Medicine)    Chief Complaint::   Chief Complaint   Patient presents with    Abdominal Pain    Stool Color Change        HPI  History of Present Illness  The patient presents for evaluation of abdominal pain and dark stools.    She reports experiencing severe abdominal cramping, reminiscent of labor contractions, which radiates to her back. These symptoms are triggered by food or drink intake and subside after a few hours. The onset of these symptoms was noted on Friday night following a meal at [facility], where she consumed salad, chicken, and a Becki with Coke Zero. The following morning, she experienced an unusual taste in her mouth and had a bowel movement characterized by black diarrhea. This continued for two days, from Saturday morning until Sunday. During a family reunion on Sunday, she attempted to eat but was immediately overcome by cramping, leading to vomiting. Since then, her food intake has been minimal, primarily consisting of chicken broth, crackers, and Pedialyte. She reports no current nausea or fever but continues to experience severe cramping upon eating. She also reports decreased energy levels and significant swelling in her ankles. She describes a burning sensation throughout her body and inflammation in her hands, although previous tests for inflammation factors and uric acid levels were normal. She has a history of kidney stones and cholecystectomy. She has been taking Kaopectate since the day before yesterday due to the severity of her cramps. She has remotely undergone an upper endoscopy, which revealed esophageal lesions, and has been referred to a gastroenterologist for a colonoscopy. She has previously been prescribed omeprazole 40 mg. Certain movements can exacerbate her cramping.    She has been experiencing black stools since  Saturday morning. The only foods she can tolerate without exacerbating the cramps are chicken broth and crackers.    FAMILY HISTORY  Her mother has had gout four or five times.  Her father had ulcers.    MEDICATIONS  Current: Pedialyte, Kaopectate  Past: Omeprazole      Chronic Conditions:      Patient Active Problem List   Diagnosis    Healthcare maintenance        Past Medical History:   Diagnosis Date    Anxiety     Yes, off and on over the years    Arthritis     Cholelithiasis     Had gb removed    Colon polyp     Depression     GERD (gastroesophageal reflux disease)     Kidney stone     MTHFR gene mutation     Obesity     Urinary tract infection        Past Surgical History:   Procedure Laterality Date     SECTION      CHOLECYSTECTOMY      COLONOSCOPY  2018    HYSTERECTOMY      partial    TONSILLECTOMY      TUBAL ABDOMINAL LIGATION         Family History   Problem Relation Age of Onset    Hypertension Mother     Alcohol abuse Mother     Anxiety disorder Mother     Alcohol abuse Father     Anxiety disorder Father     Depression Father     Anxiety disorder Maternal Grandmother     Anxiety disorder Maternal Aunt     Breast cancer Maternal Aunt         great post menopausal    Alcohol abuse Maternal Grandfather     Heart attack Paternal Grandfather     Heart failure Paternal Grandfather     Ovarian cancer Neg Hx        Social History     Socioeconomic History    Marital status:    Tobacco Use    Smoking status: Never    Smokeless tobacco: Never   Vaping Use    Vaping status: Never Used   Substance and Sexual Activity    Alcohol use: Yes     Comment: Wvery once in awhile    Drug use: Not Currently     Types: Marijuana    Sexual activity: Yes     Partners: Male     Birth control/protection: Post-menopausal, Tubal ligation, Hysterectomy       No Known Allergies      Current Outpatient Medications:     l-methylfolate-algae-B12-B6 3-90.314-2-35 MG capsule capsule, Take 1 capsule by mouth 2 (Two)  "Times a Day for 90 days., Disp: 60 capsule, Rfl: 2    Magnesium Glycinate 100 MG capsule, Take  by mouth Every Night., Disp: , Rfl:     Omega-3 1000 MG capsule, Take 1 capsule by mouth Daily., Disp: , Rfl:     Vitamin D-Vitamin K (VITAMIN K2-VITAMIN D3 PO), Take 1 tablet by mouth Daily. (Patient taking differently: Take 1 tablet by mouth Daily. 40,000 units daily), Disp: , Rfl:     omeprazole (priLOSEC) 40 MG capsule, Take 1 capsule by mouth Daily., Disp: 30 capsule, Rfl: 0    Review of Systems   Constitutional: Negative.    Respiratory: Negative.  Negative for chest tightness and shortness of breath.    Cardiovascular: Negative.  Negative for chest pain.   Gastrointestinal:  Positive for abdominal pain. Negative for blood in stool and constipation.        Vital Signs  Vitals:    03/13/25 1510   BP: 146/82   BP Location: Left arm   Patient Position: Sitting   Cuff Size: Adult   Pulse: 79   Temp: 98.4 °F (36.9 °C)   TempSrc: Infrared   SpO2: 99%   Weight: 105 kg (230 lb 12.8 oz)   Height: 162.5 cm (63.98\")   PainSc: 8    PainLoc: Abdomen       Physical Exam  Constitutional:       General: She is not in acute distress.     Appearance: Normal appearance.   Cardiovascular:      Rate and Rhythm: Normal rate and regular rhythm.   Pulmonary:      Effort: Pulmonary effort is normal.      Breath sounds: Normal breath sounds.   Abdominal:      General: Bowel sounds are normal.      Palpations: Abdomen is soft.      Tenderness: There is no abdominal tenderness.   Neurological:      Mental Status: She is alert.        Physical Exam  Vital Signs  Blood pressure is normal.    Procedures    ACE III MINI        Results  Laboratory Studies  Inflammatory markers, rheumatoid factor all normal. Liver function tests are normal.           Assessment/Plan:    Diagnoses and all orders for this visit:    1. Melena (Primary)  -     CBC & Differential; Future    2. Generalized abdominal pain    Other orders  -     omeprazole (priLOSEC) 40 MG " capsule; Take 1 capsule by mouth Daily.  Dispense: 30 capsule; Refill: 0      Assessment & Plan  1. Abdominal pain.  She reports extreme cramping when eating or drinking, described as similar to labor contractions, and resonating to her back. The pain subsides after a few hours but returns with any food intake. She has been able to tolerate chicken broth and crackers. She is hemodynamically stable and has been able to maintain hydration. She does complain of mild weakness, no postural hypotension. Likely this is blood loss from the upper GI tract. Omeprazole 40 mg daily has been prescribed, with instructions to take one dose tonight and subsequently take it 15-20 minutes before breakfast each morning. She is advised to continue using Kaopectate if it helps with the cramps. If she develops postural hypotension, weakness, or further bleeding, she should go to the hospital.    2. Melena.  She reports black stools since Saturday morning, following a meal at [employer] on Friday night. The presence of melena suggests upper gastrointestinal bleeding, possibly due to an ulcer. A complete blood count (CBC) will be obtained to assess for anemia. A referral for an esophagogastroduodenoscopy (EGD) will be made to investigate the source of the bleeding. If her hemoglobin is not under 10, an elective endoscopy will be scheduled. If she experiences sudden weakness, inability to stand, or continued bleeding, she should go to the hospital.    PROCEDURE  The patient has previously undergone an upper endoscopy, which revealed esophageal lesions.      Plan of care reviewed with patient at the conclusion of today's visit. Education was provided regarding diagnosis, management, and any prescribed or recommended OTC medications.Patient verbalizes understanding of and agreement with management plan.     Patient or patient representative verbalized consent for the use of Ambient Listening during the visit with  Harmeet Juarez  MD for chart documentation. 3/14/2025  11:04 EDT        Harmeet Juarez MD

## 2025-03-13 NOTE — TELEPHONE ENCOUNTER
Patient called in to say that she has been having stomach pains since Saturday morning  . She has been nauseous and having diarrhea , a lot of cramping  but she noticed as well black stools. She is not able to eat much she said , on ly a little chicken broth, noodles rice and some crackers. She is refusing to go to the ER or the urgent care . She has been taking kaopectate but has not taken any today . I advised her then  that I can have her come in to see Dr Turcios at 2pm, but was told that the appt would be with Dr Juarez instead.  Patient verbally agreed to come in to see Dr Juarez at 2:45 pm today.

## 2025-03-14 ENCOUNTER — TELEPHONE (OUTPATIENT)
Dept: INTERNAL MEDICINE | Facility: CLINIC | Age: 60
End: 2025-03-14

## 2025-03-14 ENCOUNTER — TELEPHONE (OUTPATIENT)
Dept: GASTROENTEROLOGY | Facility: CLINIC | Age: 60
End: 2025-03-14
Payer: COMMERCIAL

## 2025-03-14 DIAGNOSIS — R10.84 GENERALIZED ABDOMINAL PAIN: ICD-10-CM

## 2025-03-14 DIAGNOSIS — K92.1 MELENA: Primary | ICD-10-CM

## 2025-03-14 NOTE — TELEPHONE ENCOUNTER
Pt stated she was seeing 2 different lab results in her MyChart.  I explained that I did not see the same on my side.  I asked her to screen shot a picture into her MyChart so I could see what she is seeing and go from there.  She voiced understanding.

## 2025-03-14 NOTE — TELEPHONE ENCOUNTER
Caller: Radha Dow    Relationship: Self    Best call back number:   Telephone Information:   Mobile [phone]       What test was performed: BLOOD WORK     When was the test performed: 03/13/25    Where was the test performed: [facility]    Additional notes:

## 2025-03-14 NOTE — TELEPHONE ENCOUNTER
Her hemoglobin is normal.  Find out how she is feeling today.  She should continue taking omeprazole I will arrange for her to have upper endoscopy.

## 2025-03-14 NOTE — TELEPHONE ENCOUNTER
PT CALLED STATED SHE HASN'T TALKED TO ANYONE ABOUT HER LABS.  I HAVE READ THE MESSAGE FROM DR WALDROP TO HER AND LET HER KNOW THAT SOMEONE WILL BE CALLING ABOUT HER EGD.  TRANSFERRED THE CALL TO CLINICAL.

## 2025-03-14 NOTE — TELEPHONE ENCOUNTER
Patient having black stool since last week, recently started kaopectate two days ago; no additional symptoms.

## 2025-04-08 RX ORDER — OMEPRAZOLE 40 MG/1
40 CAPSULE, DELAYED RELEASE ORAL DAILY
Qty: 30 CAPSULE | Refills: 0 | Status: SHIPPED | OUTPATIENT
Start: 2025-04-08

## 2025-05-02 ENCOUNTER — TELEMEDICINE (OUTPATIENT)
Dept: INTERNAL MEDICINE | Facility: CLINIC | Age: 60
End: 2025-05-02
Payer: COMMERCIAL

## 2025-05-02 DIAGNOSIS — J40 BRONCHITIS: Primary | ICD-10-CM

## 2025-05-02 PROCEDURE — 99213 OFFICE O/P EST LOW 20 MIN: CPT | Performed by: STUDENT IN AN ORGANIZED HEALTH CARE EDUCATION/TRAINING PROGRAM

## 2025-05-02 RX ORDER — AZITHROMYCIN 250 MG/1
TABLET, FILM COATED ORAL
Qty: 6 TABLET | Refills: 0 | Status: SHIPPED | OUTPATIENT
Start: 2025-05-02

## 2025-05-02 NOTE — PROGRESS NOTES
Office Note     Name: Radha Dow    : 1965     MRN: [id number]     Chief Complaint  No chief complaint on file.      Patient was seen today through synchronous audio/video technology. Verbal consent was obtained. The patient was located at home. Cassia Pina MD was located at [facility] in [place], [place]. Vitals signs were not obtained due to lack of home monitoring access. Time spent with patient was 10 minutes.     Subjective     History of Present Illness:  Radha Dow is a 59 y.o. female who presents today for sinus infection.     History of Present Illness  The patient presents via virtual visit for evaluation of cough, drainage, and sinus congestion.    Symptoms initially began on  and were thought to be related to allergies. However, by today, there is a perception of the symptoms progressing into the lungs. Significant drainage and congestion are reported, along with a sensation of ear fullness. No fevers or chills have been experienced at home. Breathing is generally unlabored, except during episodes of coughing. The cough is productive, yielding yellowish-green sputum, after which relief is experienced. No contact with sick individuals has occurred. There are no known allergies to antibiotics. A few months ago, azithromycin was prescribed and proved to be effective.    Review of Systems:   Review of Systems   Constitutional:  Negative for chills and fever.   HENT:  Positive for congestion, postnasal drip, rhinorrhea and sinus pressure.    Respiratory:  Positive for cough. Negative for shortness of breath.        Past Medical History:   Past Medical History:   Diagnosis Date    Anxiety     Yes, off and on over the years    Arthritis     Cholelithiasis     Had gb removed    Colon polyp     Depression     GERD (gastroesophageal reflux disease)     Kidney stone     MTHFR gene mutation     Obesity     Urinary tract infection        Past Surgical  History:   Past Surgical History:   Procedure Laterality Date     SECTION      CHOLECYSTECTOMY      COLONOSCOPY  2018    HYSTERECTOMY      partial    TONSILLECTOMY  1989    TUBAL ABDOMINAL LIGATION         Family History:   Family History   Problem Relation Age of Onset    Hypertension Mother     Alcohol abuse Mother     Anxiety disorder Mother     Alcohol abuse Father     Anxiety disorder Father     Depression Father     Anxiety disorder Maternal Grandmother     Anxiety disorder Maternal Aunt     Breast cancer Maternal Aunt         great post menopausal    Alcohol abuse Maternal Grandfather     Heart attack Paternal Grandfather     Heart failure Paternal Grandfather     Ovarian cancer Neg Hx        Social History:   Social History     Socioeconomic History    Marital status:    Tobacco Use    Smoking status: Never    Smokeless tobacco: Never   Vaping Use    Vaping status: Never Used   Substance and Sexual Activity    Alcohol use: Yes     Comment: Wvery once in awhile    Drug use: Not Currently     Types: Marijuana    Sexual activity: Yes     Partners: Male     Birth control/protection: Post-menopausal, Tubal ligation, Hysterectomy       Immunizations:   Immunization History   Administered Date(s) Administered    COVID-19 (PFIZER) Purple Cap Monovalent 2021, 2021    Fluzone  >6mos 10/04/2024    Fluzone (or Fluarix & Flulaval for VFC) >6mos 10/30/2018    Shingrix 10/04/2024, 2025    Td (TDVAX) 10/04/2024        Medications:     Current Outpatient Medications:     azithromycin (Zithromax Z-Srinath) 250 MG tablet, Take 2 tablets by mouth on day 1, then 1 tablet daily on days 2-5, Disp: 6 tablet, Rfl: 0    Magnesium Glycinate 100 MG capsule, Take  by mouth Every Night., Disp: , Rfl:     Omega-3 1000 MG capsule, Take 1 capsule by mouth Daily., Disp: , Rfl:     omeprazole (priLOSEC) 40 MG capsule, TAKE 1 CAPSULE BY MOUTH DAILY, Disp: 30 capsule, Rfl: 0    Vitamin D-Vitamin K (VITAMIN  K2-VITAMIN D3 PO), Take 1 tablet by mouth Daily. (Patient taking differently: Take 1 tablet by mouth Daily. 40,000 units daily), Disp: , Rfl:     Allergies:   No Known Allergies    Objective     Vital Signs  There were no vitals taken for this visit.  There is no height or weight on file to calculate BMI.            Physical Exam  Constitutional:       Appearance: Normal appearance.   HENT:      Head: Normocephalic and atraumatic.   Pulmonary:      Effort: No respiratory distress.   Neurological:      General: No focal deficit present.      Mental Status: She is alert and oriented to person, place, and time.   Psychiatric:         Mood and Affect: Mood normal.         Behavior: Behavior normal.          Assessment and Plan     Assessment/Plan:  Diagnoses and all orders for this visit:    1. Bronchitis (Primary)  - Symptoms include cough, drainage, sinus congestion, and discolored sputum  - No significant respiratory distress noted; no fever or chills reported.  - Discussed the possibility of a viral infection versus bacterial infection  - Azithromycin prescribed. Over-the-counter Mucinex recommended to aid mucus expectoration. Advised to return for follow-up if symptoms worsen by Monday.  -     azithromycin (Zithromax Z-Srinath) 250 MG tablet; Take 2 tablets by mouth on day 1, then 1 tablet daily on days 2-5  Dispense: 6 tablet; Refill: 0      Follow Up  No follow-ups on file.    Patient or patient representative verbalized consent for the use of Ambient Listening during the visit with  Ronna Pina MD for chart documentation. 5/2/2025  11:16 EDT      Ronna Pina MD   [facility] [place] [id number]

## 2025-06-05 ENCOUNTER — TELEPHONE (OUTPATIENT)
Dept: INTERNAL MEDICINE | Facility: CLINIC | Age: 60
End: 2025-06-05
Payer: COMMERCIAL

## 2025-06-05 NOTE — TELEPHONE ENCOUNTER
Spoke with patient in regard to [facility] paperwork. Patient stated she thought it was faxed to us on 5/30 from [facility]. Patient stated urgency of receiving paperwork. She would like to  a copy for herself, but would also like us to fax it to [phone]. Please advise.

## 2025-06-06 NOTE — TELEPHONE ENCOUNTER
Please advise patient she will need an appt to discuss FMLA and notify her we still have not received those forms unless they are in the fax queue.

## 2025-06-16 NOTE — TELEPHONE ENCOUNTER
CALLED PATIENT A FEW TIMES AND THERE HAS BEEN NO ANSWER. LVM FOR A CALL BACK TO SET UP AN APPT AND INFORM HER WE DON'T HAVE THE [facility] PAPERWORK. NOT SURE WHAT YOU ALL WOULD LIKE TO DO?

## 2025-07-11 ENCOUNTER — OFFICE VISIT (OUTPATIENT)
Dept: INTERNAL MEDICINE | Facility: CLINIC | Age: 60
End: 2025-07-11
Payer: COMMERCIAL

## 2025-07-11 VITALS
TEMPERATURE: 97.2 F | HEART RATE: 70 BPM | SYSTOLIC BLOOD PRESSURE: 136 MMHG | BODY MASS INDEX: 38.14 KG/M2 | OXYGEN SATURATION: 97 % | DIASTOLIC BLOOD PRESSURE: 82 MMHG | WEIGHT: 223.4 LBS | HEIGHT: 64 IN

## 2025-07-11 DIAGNOSIS — M25.562 ARTHRALGIA OF BOTH KNEES: ICD-10-CM

## 2025-07-11 DIAGNOSIS — M25.561 ARTHRALGIA OF BOTH KNEES: ICD-10-CM

## 2025-07-11 DIAGNOSIS — N95.1 HOT FLASHES DUE TO MENOPAUSE: Primary | ICD-10-CM

## 2025-07-11 PROCEDURE — 99214 OFFICE O/P EST MOD 30 MIN: CPT | Performed by: STUDENT IN AN ORGANIZED HEALTH CARE EDUCATION/TRAINING PROGRAM

## 2025-07-11 RX ORDER — MV-MIN NO.113/IRON/FOLIC ACID 4.5 MG-2
1 CAPSULE ORAL 2 TIMES DAILY
COMMUNITY

## 2025-07-11 NOTE — PROGRESS NOTES
Office Note     Name: Radha Dow    : 1965     MRN: [id number]     Chief Complaint  6 month follow up (Hormone questions/Referral to gyno/Medical [place] questions/Chronic fatigue wants a test  /FMLA paperwork/)    Subjective     History of Present Illness:  Radha Dow is a 59 y.o. female who presents today for follow-up    History of Present Illness    She has initiated a ketogenic diet, which she reports has improved her overall well-being. However, she continues to experience severe joint pain, particularly in her ankles, which become swollen and red at night. Despite previous workups returning normal results, she also reports fatigue. She reports no rashes, breathing difficulties, dry mouth, or dry eyes. She is uncertain about having undergone an YONG test. She has not consulted a rheumatologist. She believes that weight loss might alleviate her symptoms.    She has never consulted a gynecologist due to time constraints at work. She is seeking a referral to a gynecologist and is interested in natural hormone replacement therapy. She underwent a partial hysterectomy at age 33, retaining her ovaries. A functional medicine doctor informed her 4 to 5 years ago that her hormone levels were extremely low, comparable to those of a 72-year-old woman. She was prescribed estrogen, progesterone, and testosterone injections, which she found intolerable. She continues to experience hot flashes.    She has been experiencing anxiety throughout her life and is interested in exploring medical marijuana as a potential treatment option.    Her acid reflux symptoms have improved with her diet, and she is no longer taking omeprazole.      Review of Systems:   Review of Systems   Constitutional:  Positive for fatigue. Negative for chills and fever.   Respiratory:  Negative for shortness of breath.    Musculoskeletal:  Positive for arthralgias.   Skin:  Negative for rash.       Past Medical History:   Past  Medical History:   Diagnosis Date    Anxiety     Yes, off and on over the years    Arthritis     Cholelithiasis     Had gb removed    Colon polyp     Depression     GERD (gastroesophageal reflux disease)     Kidney stone     MTHFR gene mutation     Obesity     Urinary tract infection        Past Surgical History:   Past Surgical History:   Procedure Laterality Date     SECTION      CHOLECYSTECTOMY      COLONOSCOPY  2018    HYSTERECTOMY      partial    TONSILLECTOMY  1989    TUBAL ABDOMINAL LIGATION         Family History:   Family History   Problem Relation Age of Onset    Hypertension Mother     Alcohol abuse Mother     Anxiety disorder Mother     Alcohol abuse Father     Anxiety disorder Father     Depression Father     Anxiety disorder Maternal Grandmother     Anxiety disorder Maternal Aunt     Breast cancer Maternal Aunt         great post menopausal    Alcohol abuse Maternal Grandfather     Heart attack Paternal Grandfather     Heart failure Paternal Grandfather     Ovarian cancer Neg Hx        Social History:   Social History     Socioeconomic History    Marital status:    Tobacco Use    Smoking status: Never    Smokeless tobacco: Never   Vaping Use    Vaping status: Never Used   Substance and Sexual Activity    Alcohol use: Yes     Comment: Wvery once in awhile    Drug use: Not Currently     Types: Marijuana    Sexual activity: Yes     Partners: Male     Birth control/protection: Post-menopausal, Tubal ligation, Hysterectomy       Immunizations:   Immunization History   Administered Date(s) Administered    COVID-19 (PFIZER) Purple Cap Monovalent 2021, 2021    Fluzone  >6mos 10/04/2024    Fluzone (or Fluarix & Flulaval for VFC) >6mos 10/30/2018    Shingrix 10/04/2024, 2025    Td (TDVAX) 10/04/2024        Medications:     Current Outpatient Medications:     Alpha Lipoic Acid 200 MG capsule, Take 1 capsule by mouth 2 (Two) Times a Day., Disp: , Rfl:     Magnesium Glycinate  "100 MG capsule, Take  by mouth Every Night., Disp: , Rfl:     Omega-3 1000 MG capsule, Take 1 capsule by mouth Daily., Disp: , Rfl:     Vitamin D-Vitamin K (VITAMIN K2-VITAMIN D3 PO), Take 1 tablet by mouth Daily. (Patient taking differently: Take 1 tablet by mouth Daily. 40,000 units daily), Disp: , Rfl:     Allergies:   No Known Allergies    Objective     Vital Signs  /82 (BP Location: Right arm, Patient Position: Sitting, Cuff Size: Adult)   Pulse 70   Temp 97.2 °F (36.2 °C)   Ht 162.5 cm (63.98\")   Wt 101 kg (223 lb 6.4 oz)   SpO2 97%   BMI 38.38 kg/m²   Body mass index is 38.38 kg/m².            Physical Exam  Constitutional:       Appearance: Normal appearance.   HENT:      Head: Normocephalic and atraumatic.   Eyes:      Extraocular Movements: Extraocular movements intact.      Conjunctiva/sclera: Conjunctivae normal.   Pulmonary:      Effort: Pulmonary effort is normal. No respiratory distress.   Neurological:      General: No focal deficit present.      Mental Status: She is alert and oriented to person, place, and time.   Psychiatric:         Mood and Affect: Mood normal.         Behavior: Behavior normal.            Assessment and Plan     Assessment/Plan:  Diagnoses and all orders for this visit:    1. Hot flashes due to menopause (Primary)  - Experiencing severe hot flashes and other menopausal symptoms.  - History of hysterectomy at age 33, with ovaries intact; hormone levels previously evaluated by a functional medicine doctor and reportedly low.  - Prefers natural treatments over medication  - Referral to gynecology for further evaluation and management.  -     Ambulatory Referral to Gynecology    2. Arthralgia of both knees  - Persistent diffuse joint pain and swelling, particularly in ankles, with episodes of redness at night.  - Rheumatoid factor and CCP were negative; osteoarthritis is suspected.  - Discussed the possibility of autoimmune conditions contributing to diffuse " arthralgias; YONG test will be ordered.  - Referral to rheumatology not deemed necessary at this time.  -     YONG Direct Reflex to [facility] [employer]; Future      Follow Up  Return in about 6 months (around 1/11/2026) for Annual physical.    Patient or patient representative verbalized consent for the use of Ambient Listening during the visit with  Ronna Pina MD for chart documentation. 7/14/2025  10:47 EDT      Ronna Pina MD   [facility] [place] [id number]